# Patient Record
Sex: FEMALE | URBAN - METROPOLITAN AREA
[De-identification: names, ages, dates, MRNs, and addresses within clinical notes are randomized per-mention and may not be internally consistent; named-entity substitution may affect disease eponyms.]

---

## 2020-11-06 ENCOUNTER — OFFICE VISIT (OUTPATIENT)
Dept: URBAN - METROPOLITAN AREA CLINIC 16 | Facility: CLINIC | Age: 80
End: 2020-11-06
Payer: COMMERCIAL

## 2020-11-06 DIAGNOSIS — H02.885 MEIBOMIAN GLAND DYSFUNCTION LEFT LOWER EYELID: Primary | ICD-10-CM

## 2020-11-06 DIAGNOSIS — H04.123 DRY EYE SYNDROME OF BILATERAL LACRIMAL GLANDS: ICD-10-CM

## 2020-11-06 DIAGNOSIS — H25.13 AGE-RELATED NUCLEAR CATARACT, BILATERAL: ICD-10-CM

## 2020-11-06 PROCEDURE — 99203 OFFICE O/P NEW LOW 30 MIN: CPT | Performed by: OPHTHALMOLOGY

## 2020-11-06 ASSESSMENT — KERATOMETRY
OD: 43.63
OS: 44.38

## 2020-11-06 NOTE — IMPRESSION/PLAN
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  all 4 lids Plan: hot packs with message

## 2020-11-06 NOTE — IMPRESSION/PLAN
Impression: Age-related nuclear cataract, bilateral: H25.13. Plan: reeval after some MGD and ARTEMIO tx.

## 2025-06-04 ENCOUNTER — OFFICE VISIT (OUTPATIENT)
Dept: URBAN - METROPOLITAN AREA CLINIC 16 | Facility: CLINIC | Age: 85
End: 2025-06-04
Payer: MEDICARE

## 2025-06-04 DIAGNOSIS — H25.813 COMBINED FORMS OF AGE-RELATED CATARACT, BILATERAL: Primary | ICD-10-CM

## 2025-06-04 DIAGNOSIS — H18.593 OTHER HEREDITARY CORNEAL DYSTROPHIES, BILATERAL: ICD-10-CM

## 2025-06-04 PROCEDURE — 92004 COMPRE OPH EXAM NEW PT 1/>: CPT | Performed by: OPTOMETRIST

## 2025-06-04 ASSESSMENT — INTRAOCULAR PRESSURE
OD: 13
OS: 13

## 2025-06-20 ENCOUNTER — OFFICE VISIT (OUTPATIENT)
Dept: URBAN - METROPOLITAN AREA CLINIC 16 | Facility: CLINIC | Age: 85
End: 2025-06-20
Payer: MEDICARE

## 2025-06-20 DIAGNOSIS — H25.813 COMBINED FORMS OF AGE-RELATED CATARACT, BILATERAL: Primary | ICD-10-CM

## 2025-06-20 PROCEDURE — 99204 OFFICE O/P NEW MOD 45 MIN: CPT | Performed by: OPHTHALMOLOGY

## 2025-06-20 ASSESSMENT — VISUAL ACUITY
OD: 20/30
OS: 20/30

## 2025-06-20 ASSESSMENT — INTRAOCULAR PRESSURE
OD: 14
OS: 14

## 2025-07-02 ENCOUNTER — TECH ONLY (OUTPATIENT)
Dept: URBAN - METROPOLITAN AREA CLINIC 16 | Facility: CLINIC | Age: 85
End: 2025-07-02
Payer: MEDICARE

## 2025-07-02 DIAGNOSIS — H25.813 COMBINED FORMS OF AGE-RELATED CATARACT, BILATERAL: Primary | ICD-10-CM

## 2025-07-02 RX ORDER — KETOROLAC TROMETHAMINE 5 MG/ML
0.5 % SOLUTION OPHTHALMIC
Qty: 5 | Refills: 1 | Status: ACTIVE
Start: 2025-07-02

## 2025-07-02 RX ORDER — PREDNISOLONE ACETATE 10 MG/ML
1 % SUSPENSION/ DROPS OPHTHALMIC
Qty: 5 | Refills: 1 | Status: ACTIVE
Start: 2025-07-02

## 2025-07-02 RX ORDER — OFLOXACIN 3 MG/ML
0.3 % SOLUTION/ DROPS OPHTHALMIC
Qty: 5 | Refills: 1 | Status: ACTIVE
Start: 2025-07-02

## 2025-07-02 ASSESSMENT — PACHYMETRY
OS: 2.90
OD: 24.06
OS: 24.22
OD: 2.90

## 2025-07-24 ENCOUNTER — SURGERY (OUTPATIENT)
Facility: LOCATION | Age: 85
End: 2025-07-24
Payer: MEDICARE

## 2025-07-24 PROCEDURE — CRBAL CREDIT BALANCE: CUSTOM | Performed by: OPHTHALMOLOGY

## 2025-07-24 PROCEDURE — 66984 XCAPSL CTRC RMVL W/O ECP: CPT | Performed by: OPHTHALMOLOGY

## 2025-07-25 ENCOUNTER — POST-OPERATIVE VISIT (OUTPATIENT)
Dept: URBAN - METROPOLITAN AREA CLINIC 16 | Facility: CLINIC | Age: 85
End: 2025-07-25
Payer: MEDICARE

## 2025-07-25 DIAGNOSIS — Z48.810 ENCOUNTER FOR SURGICAL AFTERCARE FOLLOWING SURGERY ON A SENSE ORGAN: Primary | ICD-10-CM

## 2025-07-25 PROCEDURE — 99024 POSTOP FOLLOW-UP VISIT: CPT | Performed by: OPTOMETRIST

## 2025-07-25 ASSESSMENT — INTRAOCULAR PRESSURE
OD: 14
OS: 14

## 2025-08-01 ENCOUNTER — POST-OPERATIVE VISIT (OUTPATIENT)
Dept: URBAN - METROPOLITAN AREA CLINIC 16 | Facility: CLINIC | Age: 85
End: 2025-08-01
Payer: MEDICARE

## 2025-08-01 DIAGNOSIS — Z48.810 ENCOUNTER FOR SURGICAL AFTERCARE FOLLOWING SURGERY ON A SENSE ORGAN: Primary | ICD-10-CM

## 2025-08-01 PROCEDURE — 99024 POSTOP FOLLOW-UP VISIT: CPT | Performed by: OPTOMETRIST

## 2025-08-01 ASSESSMENT — INTRAOCULAR PRESSURE
OD: 14
OS: 14

## 2025-08-07 ENCOUNTER — SURGERY (OUTPATIENT)
Facility: LOCATION | Age: 85
End: 2025-08-07
Payer: MEDICARE

## 2025-08-07 PROCEDURE — 66984 XCAPSL CTRC RMVL W/O ECP: CPT | Performed by: OPHTHALMOLOGY

## 2025-08-08 ENCOUNTER — POST-OPERATIVE VISIT (OUTPATIENT)
Dept: URBAN - METROPOLITAN AREA CLINIC 16 | Facility: CLINIC | Age: 85
End: 2025-08-08
Payer: MEDICARE

## 2025-08-08 DIAGNOSIS — Z96.1 PRESENCE OF INTRAOCULAR LENS: Primary | ICD-10-CM

## 2025-08-08 PROCEDURE — 99024 POSTOP FOLLOW-UP VISIT: CPT | Performed by: OPTOMETRIST

## 2025-08-08 ASSESSMENT — INTRAOCULAR PRESSURE
OS: 14
OD: 14

## 2025-08-14 ENCOUNTER — POST-OPERATIVE VISIT (OUTPATIENT)
Dept: URBAN - METROPOLITAN AREA CLINIC 16 | Facility: CLINIC | Age: 85
End: 2025-08-14

## 2025-08-14 DIAGNOSIS — Z96.1 PRESENCE OF INTRAOCULAR LENS: Primary | ICD-10-CM

## 2025-08-14 PROCEDURE — 99024 POSTOP FOLLOW-UP VISIT: CPT | Performed by: OPTOMETRIST

## 2025-08-14 ASSESSMENT — INTRAOCULAR PRESSURE
OD: 14
OS: 14